# Patient Record
Sex: FEMALE | Race: WHITE | Employment: OTHER | URBAN - METROPOLITAN AREA
[De-identification: names, ages, dates, MRNs, and addresses within clinical notes are randomized per-mention and may not be internally consistent; named-entity substitution may affect disease eponyms.]

---

## 2023-10-07 RX ORDER — ALPRAZOLAM 0.25 MG
1 TABLET ORAL
Status: ON HOLD | COMMUNITY
End: 2023-10-10

## 2023-10-07 RX ORDER — TRIAMCINOLONE ACETONIDE 40 MG/ML
40 INJECTION, SUSPENSION INTRA-ARTICULAR; INTRAMUSCULAR
COMMUNITY

## 2023-10-07 RX ORDER — LEVOTHYROXINE SODIUM 25 UG/1
1 TABLET ORAL
COMMUNITY

## 2023-10-07 RX ORDER — CYCLOBENZAPRINE HCL 10 MG
1 TABLET ORAL 3 TIMES DAILY PRN
COMMUNITY

## 2023-10-07 NOTE — PROGRESS NOTES
PTA medications updated by Medication Scribe prior to surgery via phone call with patient (last doses completed by Nurse)     Medication history sources: Patient and H&P  In the past week, patient estimated taking medication this percent of the time: Greater than 90%      Significant changes made to the medication list:  Patient reports no longer taking the following meds (med scribe removed from PTA med list): Alendronate, Percocet      Additional medication history information:   Patient was advised to bring: Fluorometholone Suspension    Medication reconciliation completed by provider prior to medication history? No    Time spent in this activity: 20 minutes    The information provided in this note is only as accurate as the sources available at the time of update(s)    Prior to Admission medications    Medication Sig Last Dose Taking? Auth Provider Long Term End Date   ALPRAZolam (XANAX) 0.25 MG tablet Take 1 tablet by mouth every evening as needed Unknown at prn Yes Reported, Patient     alprazolam (XANAX) 0.25 MG tablet Take 1 tablet by mouth every morning  at am Yes Reported, Patient     cyclobenzaprine (FLEXERIL) 10 MG tablet Take 1 tablet by mouth 3 times daily as needed for muscle spasms Unknown at prn Yes Reported, Patient     fluorometholone (FML) 0.1 % ophthalmic suspension Place 1 drop Into the left eye daily.  at am Yes Reported, Patient     levothyroxine (SYNTHROID/LEVOTHROID) 25 MCG tablet Take 1 tablet by mouth every morning (before breakfast)  at am Yes Reported, Patient Yes    triamcinolone (KENALOG-40) 40 MG/ML injection Inject 40 mg into the muscle every 6 months AUGUST 2023 at Unknown Yes Reported, Patient         Medication history completed by:    Nahun Adrian CPhT  Medication Scribe  [facility]

## 2023-10-08 ENCOUNTER — ANESTHESIA EVENT (OUTPATIENT)
Dept: SURGERY | Facility: CLINIC | Age: 78
End: 2023-10-08
Payer: MEDICARE

## 2023-10-08 ASSESSMENT — LIFESTYLE VARIABLES: TOBACCO_USE: 1

## 2023-10-09 ENCOUNTER — APPOINTMENT (OUTPATIENT)
Dept: GENERAL RADIOLOGY | Facility: CLINIC | Age: 78
End: 2023-10-09
Attending: ORTHOPAEDIC SURGERY
Payer: MEDICARE

## 2023-10-09 ENCOUNTER — HOSPITAL ENCOUNTER (OUTPATIENT)
Facility: CLINIC | Age: 78
Discharge: HOME | End: 2023-10-10
Attending: ORTHOPAEDIC SURGERY | Admitting: ORTHOPAEDIC SURGERY
Payer: MEDICARE

## 2023-10-09 ENCOUNTER — MEDICAL CORRESPONDENCE (OUTPATIENT)
Dept: HEALTH INFORMATION MANAGEMENT | Facility: CLINIC | Age: 78
End: 2023-10-09

## 2023-10-09 ENCOUNTER — ANESTHESIA (OUTPATIENT)
Dept: SURGERY | Facility: CLINIC | Age: 78
End: 2023-10-09
Payer: MEDICARE

## 2023-10-09 ENCOUNTER — APPOINTMENT (OUTPATIENT)
Dept: PHYSICAL THERAPY | Facility: CLINIC | Age: 78
End: 2023-10-09
Attending: ORTHOPAEDIC SURGERY
Payer: MEDICARE

## 2023-10-09 DIAGNOSIS — Z96.641 HISTORY OF TOTAL HIP REPLACEMENT, RIGHT: Primary | ICD-10-CM

## 2023-10-09 PROBLEM — E03.8 OTHER SPECIFIED HYPOTHYROIDISM: Status: ACTIVE | Noted: 2023-10-02

## 2023-10-09 LAB
CREAT SERPL-MCNC: 0.8 MG/DL (ref 0.51–0.95)
EGFRCR SERPLBLD CKD-EPI 2021: 76 ML/MIN/1.73M2

## 2023-10-09 PROCEDURE — 258N000003 HC RX IP 258 OP 636: Performed by: ANESTHESIOLOGY

## 2023-10-09 PROCEDURE — 258N000003 HC RX IP 258 OP 636: Performed by: ORTHOPAEDIC SURGERY

## 2023-10-09 PROCEDURE — 999N000179 XR SURGERY CARM FLUORO LESS THAN 5 MIN W STILLS

## 2023-10-09 PROCEDURE — 97161 PT EVAL LOW COMPLEX 20 MIN: CPT | Mod: GP

## 2023-10-09 PROCEDURE — 250N000013 HC RX MED GY IP 250 OP 250 PS 637: Performed by: ORTHOPAEDIC SURGERY

## 2023-10-09 PROCEDURE — 272N000001 HC OR GENERAL SUPPLY STERILE: Performed by: ORTHOPAEDIC SURGERY

## 2023-10-09 PROCEDURE — 250N000011 HC RX IP 250 OP 636: Mod: JZ | Performed by: ANESTHESIOLOGY

## 2023-10-09 PROCEDURE — 250N000009 HC RX 250: Performed by: ANESTHESIOLOGY

## 2023-10-09 PROCEDURE — C1713 ANCHOR/SCREW BN/BN,TIS/BN: HCPCS | Performed by: ORTHOPAEDIC SURGERY

## 2023-10-09 PROCEDURE — 250N000011 HC RX IP 250 OP 636: Performed by: ORTHOPAEDIC SURGERY

## 2023-10-09 PROCEDURE — 97530 THERAPEUTIC ACTIVITIES: CPT | Mod: GP

## 2023-10-09 PROCEDURE — C1776 JOINT DEVICE (IMPLANTABLE): HCPCS | Performed by: ORTHOPAEDIC SURGERY

## 2023-10-09 PROCEDURE — 710N000009 HC RECOVERY PHASE 1, LEVEL 1, PER MIN: Performed by: ORTHOPAEDIC SURGERY

## 2023-10-09 PROCEDURE — 250N000011 HC RX IP 250 OP 636: Performed by: ANESTHESIOLOGY

## 2023-10-09 PROCEDURE — 258N000001 HC RX 258: Performed by: ORTHOPAEDIC SURGERY

## 2023-10-09 PROCEDURE — 250N000009 HC RX 250: Performed by: ORTHOPAEDIC SURGERY

## 2023-10-09 PROCEDURE — 999N000141 HC STATISTIC PRE-PROCEDURE NURSING ASSESSMENT: Performed by: ORTHOPAEDIC SURGERY

## 2023-10-09 PROCEDURE — 999N000063 XR PELVIS AND HIP PORTABLE RIGHT 1 VIEW

## 2023-10-09 PROCEDURE — 370N000017 HC ANESTHESIA TECHNICAL FEE, PER MIN: Performed by: ORTHOPAEDIC SURGERY

## 2023-10-09 PROCEDURE — 360N000085 HC SURGERY LEVEL 5 W/ FLUORO, PER MIN: Performed by: ORTHOPAEDIC SURGERY

## 2023-10-09 PROCEDURE — 97116 GAIT TRAINING THERAPY: CPT | Mod: GP

## 2023-10-09 PROCEDURE — 82565 ASSAY OF CREATININE: CPT | Performed by: ORTHOPAEDIC SURGERY

## 2023-10-09 PROCEDURE — 36415 COLL VENOUS BLD VENIPUNCTURE: CPT | Performed by: ORTHOPAEDIC SURGERY

## 2023-10-09 DEVICE — PINNACLE CANCELLOUS BONE SCREW 6.5MM X 35MM
Type: IMPLANTABLE DEVICE | Site: HIP | Status: FUNCTIONAL
Brand: PINNACLE

## 2023-10-09 DEVICE — PINNACLE HIP SOLUTIONS ALTRX POLYETHYLENE ACETABULAR LINER NEUTRAL 40MM ID 56MM OD
Type: IMPLANTABLE DEVICE | Site: HIP | Status: FUNCTIONAL
Brand: PINNACLE ALTRX

## 2023-10-09 DEVICE — PINNACLE GRIPTION ACETABULAR SHELL SECTOR 56MM OD
Type: IMPLANTABLE DEVICE | Site: HIP | Status: FUNCTIONAL
Brand: PINNACLE GRIPTION

## 2023-10-09 DEVICE — IMP STEM FEM DEPUY ACTIS STD COLLAR TPR SZ 8MM 1010-11-080: Type: IMPLANTABLE DEVICE | Site: HIP | Status: FUNCTIONAL

## 2023-10-09 DEVICE — BIOLOX DELTA TS CERAMIC FEMORAL HEAD 12/14 TAPER REVISION DIAMETER 40MM +5
Type: IMPLANTABLE DEVICE | Site: HIP | Status: FUNCTIONAL
Brand: BIOLOX DELTA

## 2023-10-09 RX ORDER — SODIUM CHLORIDE, SODIUM LACTATE, POTASSIUM CHLORIDE, CALCIUM CHLORIDE 600; 310; 30; 20 MG/100ML; MG/100ML; MG/100ML; MG/100ML
INJECTION, SOLUTION INTRAVENOUS CONTINUOUS
Status: DISCONTINUED | OUTPATIENT
Start: 2023-10-09 | End: 2023-10-10 | Stop reason: HOSPADM

## 2023-10-09 RX ORDER — VANCOMYCIN HYDROCHLORIDE 1 G/20ML
INJECTION, POWDER, LYOPHILIZED, FOR SOLUTION INTRAVENOUS PRN
Status: DISCONTINUED | OUTPATIENT
Start: 2023-10-09 | End: 2023-10-09 | Stop reason: HOSPADM

## 2023-10-09 RX ORDER — HYDROMORPHONE HCL IN WATER/PF 6 MG/30 ML
0.2 PATIENT CONTROLLED ANALGESIA SYRINGE INTRAVENOUS EVERY 5 MIN PRN
Status: DISCONTINUED | OUTPATIENT
Start: 2023-10-09 | End: 2023-10-09 | Stop reason: HOSPADM

## 2023-10-09 RX ORDER — ACETAMINOPHEN 325 MG/1
650 TABLET ORAL EVERY 4 HOURS PRN
Status: DISCONTINUED | OUTPATIENT
Start: 2023-10-12 | End: 2023-10-10 | Stop reason: HOSPADM

## 2023-10-09 RX ORDER — BISACODYL 10 MG
10 SUPPOSITORY, RECTAL RECTAL DAILY PRN
Status: DISCONTINUED | OUTPATIENT
Start: 2023-10-09 | End: 2023-10-10 | Stop reason: HOSPADM

## 2023-10-09 RX ORDER — ONDANSETRON 2 MG/ML
INJECTION INTRAMUSCULAR; INTRAVENOUS PRN
Status: DISCONTINUED | OUTPATIENT
Start: 2023-10-09 | End: 2023-10-09

## 2023-10-09 RX ORDER — HYDROMORPHONE HCL IN WATER/PF 6 MG/30 ML
0.4 PATIENT CONTROLLED ANALGESIA SYRINGE INTRAVENOUS
Status: DISCONTINUED | OUTPATIENT
Start: 2023-10-09 | End: 2023-10-10 | Stop reason: HOSPADM

## 2023-10-09 RX ORDER — FENTANYL CITRATE 0.05 MG/ML
50 INJECTION, SOLUTION INTRAMUSCULAR; INTRAVENOUS EVERY 5 MIN PRN
Status: DISCONTINUED | OUTPATIENT
Start: 2023-10-09 | End: 2023-10-09 | Stop reason: HOSPADM

## 2023-10-09 RX ORDER — NALOXONE HYDROCHLORIDE 0.4 MG/ML
0.2 INJECTION, SOLUTION INTRAMUSCULAR; INTRAVENOUS; SUBCUTANEOUS
Status: DISCONTINUED | OUTPATIENT
Start: 2023-10-09 | End: 2023-10-10 | Stop reason: HOSPADM

## 2023-10-09 RX ORDER — OXYCODONE HYDROCHLORIDE 5 MG/1
10 TABLET ORAL EVERY 4 HOURS PRN
Status: DISCONTINUED | OUTPATIENT
Start: 2023-10-09 | End: 2023-10-10 | Stop reason: HOSPADM

## 2023-10-09 RX ORDER — SODIUM CHLORIDE, SODIUM LACTATE, POTASSIUM CHLORIDE, CALCIUM CHLORIDE 600; 310; 30; 20 MG/100ML; MG/100ML; MG/100ML; MG/100ML
INJECTION, SOLUTION INTRAVENOUS CONTINUOUS
Status: DISCONTINUED | OUTPATIENT
Start: 2023-10-09 | End: 2023-10-09 | Stop reason: HOSPADM

## 2023-10-09 RX ORDER — NALOXONE HYDROCHLORIDE 0.4 MG/ML
0.4 INJECTION, SOLUTION INTRAMUSCULAR; INTRAVENOUS; SUBCUTANEOUS
Status: DISCONTINUED | OUTPATIENT
Start: 2023-10-09 | End: 2023-10-10 | Stop reason: HOSPADM

## 2023-10-09 RX ORDER — ONDANSETRON 4 MG/1
4 TABLET, ORALLY DISINTEGRATING ORAL EVERY 6 HOURS PRN
Status: DISCONTINUED | OUTPATIENT
Start: 2023-10-09 | End: 2023-10-10 | Stop reason: HOSPADM

## 2023-10-09 RX ORDER — LIDOCAINE 40 MG/G
CREAM TOPICAL
Status: DISCONTINUED | OUTPATIENT
Start: 2023-10-09 | End: 2023-10-10 | Stop reason: HOSPADM

## 2023-10-09 RX ORDER — ASPIRIN 81 MG/1
81 TABLET ORAL 2 TIMES DAILY
Qty: 82 TABLET | Refills: 0 | Status: SHIPPED | OUTPATIENT
Start: 2023-10-10 | End: 2023-11-20

## 2023-10-09 RX ORDER — PROCHLORPERAZINE MALEATE 5 MG
5 TABLET ORAL EVERY 6 HOURS PRN
Status: DISCONTINUED | OUTPATIENT
Start: 2023-10-09 | End: 2023-10-10 | Stop reason: HOSPADM

## 2023-10-09 RX ORDER — DEXMEDETOMIDINE HYDROCHLORIDE 4 UG/ML
INJECTION, SOLUTION INTRAVENOUS PRN
Status: DISCONTINUED | OUTPATIENT
Start: 2023-10-09 | End: 2023-10-09

## 2023-10-09 RX ORDER — TRANEXAMIC ACID 10 MG/ML
1 INJECTION, SOLUTION INTRAVENOUS ONCE
Status: DISCONTINUED | OUTPATIENT
Start: 2023-10-09 | End: 2023-10-09 | Stop reason: HOSPADM

## 2023-10-09 RX ORDER — TRANEXAMIC ACID 10 MG/ML
1 INJECTION, SOLUTION INTRAVENOUS ONCE
Status: COMPLETED | OUTPATIENT
Start: 2023-10-09 | End: 2023-10-09

## 2023-10-09 RX ORDER — ACETAMINOPHEN 325 MG/1
650 TABLET ORAL EVERY 4 HOURS PRN
Qty: 100 TABLET | Refills: 0 | COMMUNITY
Start: 2023-10-09

## 2023-10-09 RX ORDER — ASPIRIN 81 MG/1
81 TABLET ORAL 2 TIMES DAILY
Status: DISCONTINUED | OUTPATIENT
Start: 2023-10-09 | End: 2023-10-10 | Stop reason: HOSPADM

## 2023-10-09 RX ORDER — CEFAZOLIN SODIUM/WATER 2 G/20 ML
2 SYRINGE (ML) INTRAVENOUS
Status: DISCONTINUED | OUTPATIENT
Start: 2023-10-09 | End: 2023-10-09 | Stop reason: HOSPADM

## 2023-10-09 RX ORDER — ONDANSETRON 2 MG/ML
4 INJECTION INTRAMUSCULAR; INTRAVENOUS EVERY 30 MIN PRN
Status: DISCONTINUED | OUTPATIENT
Start: 2023-10-09 | End: 2023-10-09 | Stop reason: HOSPADM

## 2023-10-09 RX ORDER — HYDROMORPHONE HCL IN WATER/PF 6 MG/30 ML
0.2 PATIENT CONTROLLED ANALGESIA SYRINGE INTRAVENOUS
Status: DISCONTINUED | OUTPATIENT
Start: 2023-10-09 | End: 2023-10-10 | Stop reason: HOSPADM

## 2023-10-09 RX ORDER — POLYETHYLENE GLYCOL 3350 17 G/17G
1 POWDER, FOR SOLUTION ORAL DAILY
Qty: 7 PACKET | Refills: 0 | COMMUNITY
Start: 2023-10-09

## 2023-10-09 RX ORDER — AMOXICILLIN 250 MG
1-2 CAPSULE ORAL 2 TIMES DAILY
Qty: 30 TABLET | Refills: 0 | COMMUNITY
Start: 2023-10-09

## 2023-10-09 RX ORDER — FENTANYL CITRATE 0.05 MG/ML
25 INJECTION, SOLUTION INTRAMUSCULAR; INTRAVENOUS EVERY 5 MIN PRN
Status: DISCONTINUED | OUTPATIENT
Start: 2023-10-09 | End: 2023-10-09 | Stop reason: HOSPADM

## 2023-10-09 RX ORDER — ACETAMINOPHEN 325 MG/1
975 TABLET ORAL ONCE
Status: COMPLETED | OUTPATIENT
Start: 2023-10-09 | End: 2023-10-09

## 2023-10-09 RX ORDER — CEFAZOLIN SODIUM/WATER 2 G/20 ML
2 SYRINGE (ML) INTRAVENOUS SEE ADMIN INSTRUCTIONS
Status: DISCONTINUED | OUTPATIENT
Start: 2023-10-09 | End: 2023-10-09 | Stop reason: HOSPADM

## 2023-10-09 RX ORDER — AMOXICILLIN 250 MG
1 CAPSULE ORAL 2 TIMES DAILY
Status: DISCONTINUED | OUTPATIENT
Start: 2023-10-09 | End: 2023-10-10 | Stop reason: HOSPADM

## 2023-10-09 RX ORDER — MAGNESIUM HYDROXIDE 1200 MG/15ML
LIQUID ORAL PRN
Status: DISCONTINUED | OUTPATIENT
Start: 2023-10-09 | End: 2023-10-09 | Stop reason: HOSPADM

## 2023-10-09 RX ORDER — ONDANSETRON 2 MG/ML
4 INJECTION INTRAMUSCULAR; INTRAVENOUS EVERY 6 HOURS PRN
Status: DISCONTINUED | OUTPATIENT
Start: 2023-10-09 | End: 2023-10-10 | Stop reason: HOSPADM

## 2023-10-09 RX ORDER — HYDROMORPHONE HCL IN WATER/PF 6 MG/30 ML
0.4 PATIENT CONTROLLED ANALGESIA SYRINGE INTRAVENOUS EVERY 5 MIN PRN
Status: DISCONTINUED | OUTPATIENT
Start: 2023-10-09 | End: 2023-10-09 | Stop reason: HOSPADM

## 2023-10-09 RX ORDER — BUPIVACAINE HYDROCHLORIDE 7.5 MG/ML
INJECTION, SOLUTION INTRASPINAL PRN
Status: DISCONTINUED | OUTPATIENT
Start: 2023-10-09 | End: 2023-10-09

## 2023-10-09 RX ORDER — DEXAMETHASONE SODIUM PHOSPHATE 4 MG/ML
INJECTION, SOLUTION INTRA-ARTICULAR; INTRALESIONAL; INTRAMUSCULAR; INTRAVENOUS; SOFT TISSUE PRN
Status: DISCONTINUED | OUTPATIENT
Start: 2023-10-09 | End: 2023-10-09

## 2023-10-09 RX ORDER — CEFAZOLIN SODIUM 1 G/3ML
1 INJECTION, POWDER, FOR SOLUTION INTRAMUSCULAR; INTRAVENOUS EVERY 8 HOURS
Status: COMPLETED | OUTPATIENT
Start: 2023-10-09 | End: 2023-10-09

## 2023-10-09 RX ORDER — PROPOFOL 10 MG/ML
INJECTION, EMULSION INTRAVENOUS PRN
Status: DISCONTINUED | OUTPATIENT
Start: 2023-10-09 | End: 2023-10-09

## 2023-10-09 RX ORDER — OXYCODONE HYDROCHLORIDE 5 MG/1
5-10 TABLET ORAL EVERY 4 HOURS PRN
Qty: 26 TABLET | Refills: 0 | Status: SHIPPED | OUTPATIENT
Start: 2023-10-09

## 2023-10-09 RX ORDER — ONDANSETRON 4 MG/1
4 TABLET, ORALLY DISINTEGRATING ORAL EVERY 30 MIN PRN
Status: DISCONTINUED | OUTPATIENT
Start: 2023-10-09 | End: 2023-10-09 | Stop reason: HOSPADM

## 2023-10-09 RX ORDER — POLYETHYLENE GLYCOL 3350 17 G/17G
17 POWDER, FOR SOLUTION ORAL DAILY
Status: DISCONTINUED | OUTPATIENT
Start: 2023-10-10 | End: 2023-10-10 | Stop reason: HOSPADM

## 2023-10-09 RX ORDER — ACETAMINOPHEN 325 MG/1
975 TABLET ORAL EVERY 8 HOURS
Status: DISCONTINUED | OUTPATIENT
Start: 2023-10-09 | End: 2023-10-10 | Stop reason: HOSPADM

## 2023-10-09 RX ORDER — CELECOXIB 200 MG/1
200 CAPSULE ORAL 2 TIMES DAILY
Qty: 28 CAPSULE | Refills: 0 | Status: SHIPPED | OUTPATIENT
Start: 2023-10-10 | End: 2023-10-24

## 2023-10-09 RX ORDER — OXYCODONE HYDROCHLORIDE 5 MG/1
5 TABLET ORAL EVERY 4 HOURS PRN
Status: DISCONTINUED | OUTPATIENT
Start: 2023-10-09 | End: 2023-10-10 | Stop reason: HOSPADM

## 2023-10-09 RX ORDER — PROPOFOL 10 MG/ML
INJECTION, EMULSION INTRAVENOUS CONTINUOUS PRN
Status: DISCONTINUED | OUTPATIENT
Start: 2023-10-09 | End: 2023-10-09

## 2023-10-09 RX ORDER — KETOROLAC TROMETHAMINE 15 MG/ML
15 INJECTION, SOLUTION INTRAMUSCULAR; INTRAVENOUS EVERY 6 HOURS
Status: DISPENSED | OUTPATIENT
Start: 2023-10-09 | End: 2023-10-10

## 2023-10-09 RX ADMIN — BUPIVACAINE HYDROCHLORIDE IN DEXTROSE 1.8 ML: 7.5 INJECTION, SOLUTION SUBARACHNOID at 07:32

## 2023-10-09 RX ADMIN — SODIUM CHLORIDE, POTASSIUM CHLORIDE, SODIUM LACTATE AND CALCIUM CHLORIDE: 600; 310; 30; 20 INJECTION, SOLUTION INTRAVENOUS at 13:20

## 2023-10-09 RX ADMIN — KETOROLAC TROMETHAMINE 15 MG: 15 INJECTION INTRAMUSCULAR; INTRAVENOUS at 16:09

## 2023-10-09 RX ADMIN — PROPOFOL 20 MG: 10 INJECTION, EMULSION INTRAVENOUS at 10:32

## 2023-10-09 RX ADMIN — ASPIRIN 81 MG: 81 TABLET, COATED ORAL at 13:29

## 2023-10-09 RX ADMIN — MIDAZOLAM 1 MG: 1 INJECTION INTRAMUSCULAR; INTRAVENOUS at 07:30

## 2023-10-09 RX ADMIN — DEXMEDETOMIDINE HYDROCHLORIDE 8 MCG: 200 INJECTION INTRAVENOUS at 07:34

## 2023-10-09 RX ADMIN — DEXAMETHASONE SODIUM PHOSPHATE 10 MG: 4 INJECTION, SOLUTION INTRA-ARTICULAR; INTRALESIONAL; INTRAMUSCULAR; INTRAVENOUS; SOFT TISSUE at 07:52

## 2023-10-09 RX ADMIN — ACETAMINOPHEN 975 MG: 325 TABLET, FILM COATED ORAL at 22:08

## 2023-10-09 RX ADMIN — HYDROMORPHONE HYDROCHLORIDE 0.5 MG: 1 INJECTION, SOLUTION INTRAMUSCULAR; INTRAVENOUS; SUBCUTANEOUS at 10:35

## 2023-10-09 RX ADMIN — FENTANYL CITRATE 50 MCG: 50 INJECTION, SOLUTION INTRAMUSCULAR; INTRAVENOUS at 11:33

## 2023-10-09 RX ADMIN — SODIUM CHLORIDE, POTASSIUM CHLORIDE, SODIUM LACTATE AND CALCIUM CHLORIDE: 600; 310; 30; 20 INJECTION, SOLUTION INTRAVENOUS at 06:49

## 2023-10-09 RX ADMIN — Medication 2 G: at 07:26

## 2023-10-09 RX ADMIN — TRANEXAMIC ACID 1 G: 10 INJECTION, SOLUTION INTRAVENOUS at 10:22

## 2023-10-09 RX ADMIN — SENNOSIDES AND DOCUSATE SODIUM 1 TABLET: 50; 8.6 TABLET ORAL at 20:16

## 2023-10-09 RX ADMIN — DEXMEDETOMIDINE HYDROCHLORIDE 8 MCG: 200 INJECTION INTRAVENOUS at 08:34

## 2023-10-09 RX ADMIN — PROPOFOL 150 MCG/KG/MIN: 10 INJECTION, EMULSION INTRAVENOUS at 07:36

## 2023-10-09 RX ADMIN — OXYCODONE HYDROCHLORIDE 10 MG: 5 TABLET ORAL at 14:23

## 2023-10-09 RX ADMIN — ASPIRIN 81 MG: 81 TABLET, COATED ORAL at 22:08

## 2023-10-09 RX ADMIN — DEXMEDETOMIDINE HYDROCHLORIDE 4 MCG: 200 INJECTION INTRAVENOUS at 10:15

## 2023-10-09 RX ADMIN — SODIUM CHLORIDE, POTASSIUM CHLORIDE, SODIUM LACTATE AND CALCIUM CHLORIDE: 600; 310; 30; 20 INJECTION, SOLUTION INTRAVENOUS at 10:19

## 2023-10-09 RX ADMIN — CEFAZOLIN 1 G: 1 INJECTION, POWDER, FOR SOLUTION INTRAMUSCULAR; INTRAVENOUS at 22:09

## 2023-10-09 RX ADMIN — ONDANSETRON 4 MG: 2 INJECTION INTRAMUSCULAR; INTRAVENOUS at 10:15

## 2023-10-09 RX ADMIN — ACETAMINOPHEN 975 MG: 325 TABLET, FILM COATED ORAL at 06:59

## 2023-10-09 RX ADMIN — KETOROLAC TROMETHAMINE 15 MG: 15 INJECTION INTRAMUSCULAR; INTRAVENOUS at 22:09

## 2023-10-09 RX ADMIN — TRANEXAMIC ACID 1 G: 10 INJECTION, SOLUTION INTRAVENOUS at 07:48

## 2023-10-09 RX ADMIN — PHENYLEPHRINE HYDROCHLORIDE 150 MCG: 10 INJECTION INTRAVENOUS at 07:43

## 2023-10-09 RX ADMIN — SENNOSIDES AND DOCUSATE SODIUM 1 TABLET: 50; 8.6 TABLET ORAL at 13:32

## 2023-10-09 RX ADMIN — CEFAZOLIN 1 G: 1 INJECTION, POWDER, FOR SOLUTION INTRAMUSCULAR; INTRAVENOUS at 15:35

## 2023-10-09 RX ADMIN — PHENYLEPHRINE HYDROCHLORIDE 0.5 MCG/KG/MIN: 10 INJECTION INTRAVENOUS at 07:38

## 2023-10-09 RX ADMIN — ACETAMINOPHEN 975 MG: 325 TABLET, FILM COATED ORAL at 13:29

## 2023-10-09 RX ADMIN — MIDAZOLAM 1 MG: 1 INJECTION INTRAMUSCULAR; INTRAVENOUS at 07:26

## 2023-10-09 RX ADMIN — OXYCODONE HYDROCHLORIDE 10 MG: 5 TABLET ORAL at 20:16

## 2023-10-09 ASSESSMENT — ACTIVITIES OF DAILY LIVING (ADL)
ADLS_ACUITY_SCORE: 26
ADLS_ACUITY_SCORE: 24
ADLS_ACUITY_SCORE: 24
ADLS_ACUITY_SCORE: 26
ADLS_ACUITY_SCORE: 26
ADLS_ACUITY_SCORE: 24
ADLS_ACUITY_SCORE: 24
ADLS_ACUITY_SCORE: 26
ADLS_ACUITY_SCORE: 26

## 2023-10-09 NOTE — ANESTHESIA PROCEDURE NOTES
"Intrathecal Procedure Note    Pre-Procedure   Staff -        Anesthesiologist:  Brooks Vanegas MD       Performed By: anesthesiologist       Location: OR       Pre-Anesthestic Checklist: patient identified, IV checked, site marked, risks and benefits discussed, informed consent, monitors and equipment checked, pre-op evaluation and at physician/surgeon's request  Timeout:       Correct Patient: Yes        Correct Procedure: Yes        Correct Site: Yes        Correct Position: Yes   Procedure Documentation  Procedure: intrathecal       Patient Position: sitting       Patient Prep/Sterile Barriers: sterile gloves, mask, patient draped       Skin prep: Chloraprep       Insertion Site: L3-4. (midline approach).       Needle Gauge: 24.        Needle Length (Inches): 4        Spinal Needle Type: Pencan       Introducer used       Introducer: 20 G       # of attempts: 1 and  # of redirects:  0    Assessment/Narrative         Paresthesias: No.       CSF fluid: clear.       Opening pressure was cmH2O while  Sitting.       Comments:  Patient tolerated the procedure well      FOR [facility] ([facility]) ONLY:   Pain Team Contact information: please page the Pain Team Via [employer]. Search \"Pain\". During daytime hours, please page the attending first. At night please page the resident first.      "

## 2023-10-09 NOTE — PROGRESS NOTES
PROGRESS NOTE    SUBJECTIVE  Gisselle Dudley is status post right total hip arthroplasty on 10/9/23. She has already been up and walked the halls with PT. Patient denies any numbness/tingling in the operative extremity, chest pain or shortness of breath.     PHYSICAL EXAM  General: No acute distress. Alert and oriented to person, time and place.  Pulmonary: Breathing comfortably on room air.   Cardiac: Bilateral PT pulses are equal and symmetric. Regular rate and rhythm.   Musculoskeletal: Postoperative dressing is clean and dry. No erythema surrounding dressing. Patient demonstrates active range of motion of the operative hip.   Neurological: Patient is able to fire quadriceps, tibialis anterior, extensor hallucis longus and gastrocnemius. Sensation intact to light touch in bilateral L3-S1 distributions.     LABS  AM hemoglobin pending 10/10    IMAGING  Post-operative radiographs were reviewed and demonstrate components in good position. No evidence of fracture or dislocation.     ASSESSMENT/PLAN  #1 Status post right total hip arthroplasty on 10/9/23    Gisselle Dudley is status post the above procedure. She will work with PT again tomorrow and likely discharge home tomorrow.     Activity: Weightbearing as tolerated. Activity as tolerated. No precautions. Continue to work with PT while in hospital.   Antibiotics: Weight-based Ancef x2 doses postoperatively or until discharge.  Cultures: None  Diet: Advance as tolerated. Goal diet: General.   Dressing: Patient is able to shower. Patient will keep dressing in place until post-op day 7.  DVT Prophylaxis: Aspirin 81mg BID for 6 weeks  Pain medication: Multimodal pain regimen with ice, elevation, Tylenol 1000mg Q6H scheduled, Celebrex 200mg BID, Oxycodone 5-10mg Q4 PRN    Disposition: Stable and improving. Discharge home when patient has cleared PT

## 2023-10-09 NOTE — OP NOTE
OPERATIVE NOTE    Name: Gisselle Dudley  MRN: [id number]  Age: 78 year old    YOB: 1945    Date of Procedure: 10/9/2023      Pre-operative Diagnosis:   Right hip end-stage osteoarthritis, failure to respond to non-operative management.     Post-operative Diagnosis:   Right hip end-stage osteoarthritis, failure to respond to non-operative management.     Procedure:   Right total hip arthroplasty    Assistant:  Dana Fulton PA-C    A skilled first assistant was necessary for this procedure for assistance with patient positioning, prepping, draping, surgical visualization, wound closure, and application of the dressing.     Anesthesia:  1. Spinal    Complications:  None    Estimated blood loss:   350 cc    Transfusions:  None    Fluids:  Per anesthesia    Specimens:  None    Drain:  None      Indications:   The patient is a very pleasant 78 year old female with a chief complaint of progressive hip pain that limits their quality of life and limits their function. The physical examination demonstrates painful and limited range of motion of the right hip.  The x-ray showed complete loss of joint space, osteophytes and subchondral sclerosis involving the right hip.     The patient has tried nonoperative measures to control their pain including non-opioid pain mediation, activity modification, low impact exercises, assistive devices, and injections, none of which have provided satisfactory pain relief. The patient desires joint replacement of the hip to improve their lifestyle and reduce their pain.       Informed Consent:  Preoperatively, the risks, benefits, and possible complications of the procedure were discussed. The risks discussed included but were not limited to wear, loosening, infection, neurovascular damage, thromboembolic disease, foot drop, limb length inequality, persistent pain, lateral thigh numbness, wound healing complications, bleeding, transfusion, stiffness and dislocation. All of  the questions were satisfactorily answered and the patient wished to proceed with joint replacement surgery to improve their lifestyle and reduce their pain.       Components:  Depuy [place] Cup Size: 56 Gription  Depuy [place] Femoral Component Size: 9, standard  Liner: Neutral  Head: 40 +5  Acetabular screws: 6.5x25mm, 6.5x35mm    Procedural Pause:   The patient's correct identity, side, site, and procedure to be performed was verified.  Intravenous antibiotics were administered prior to skin incision.    Description of Procedure:   Gisselle Dudley was brought to the operating room. Neuraxial anesthesia was utilized. The patient was then transferred to the Revloc table. The feet were padded and placed into the boots for the Revloc table. The patient received weight-based Ancef and tranexamic acid prior to skin incision. A procedural pause was performed as described above. Pre-operative AP pelvis and AP of the operative hip were obtained prior to the incision and saved for comparison later in the case.   An anterior hip incision was performed approximately 2cm distal and 2cm lateral to the anterior superior iliac spine on the operative side. Dissection was carried down to the tesnor fascia which was incised in line with the skin incision. The interval between the tensor fascia jackelin and the rectus femoris was developed. The lateral circumflex vessels were identified and cauterized with electrocautery. The deep fascia was then incised and the precapsular fat removed. A capsulotomy was performed and the capsule was tagged for later repair. The osteotomy of the femoral neck was marked and checked with fluoroscopy. Satisfied with the height of the neck cut, the osteotomy was performed with the saw making sure to protect the greater trochanter. The neck and femoral head were removed. The acetabulum was then exposed and the labrum and pulvinar were removed. With the preoperative template as a guide, we then began reaming  under direct visualization and the position of the reamer was confirmed with fluoroscopy. The final reamer position was confirmed with fluoroscopy and the hip was irrigated. We then impacted the final cup which was one size greater than the final reamer under fluoroscopy and checked the cup's final position with fluoroscopy. Satisfied with the position of the cup, 2 screws were placed. The cup was again irrigated and the final liner impacted into place. We used a freer to make sure the tabs were fully seated within the cup.   The femoral elevator hook was placed around the proximal femur. The femur was externally rotated 120 degrees. We made sure traction was off the femur. The leg was then extended and adducted. As the leg was extended, I pulled the femur laterally to make sure the greater trochanter did not get stuck posterior to the acetabulum. The femoral releases were then performed starting with developing a plane between the gluteus minimus and the posterior capsule. Selective release were subsequently performed as needed combined with femoral elevation in order to adequately exposed the proximal femur for safe broaching. Satisfied with our exposure, the box osteotome was utilized to open the femur. A canal finder was then introduced followed by the starting broach. I broached until I achieved rotational stability and used the preoperative templating as a guide. Satisfied with the stability of the broach, the trial neck and head were placed. The femoral elevator was removed and the hip was brought back to the starting position. I made sure the posterior capsule was behind the femoral head and neck and wouldn't block our reduction. The hip was then reduced and traction was released. The position of the acetabulum and femur were checked with fluoroscopy. Using the images saved at the beginning of the procedure, the position of the trial was compared to the native hip and the preoperative template. The position  of the femoral trial was checked on an AP and lateral to make sure there were no previously unidentified fracture lines. Stability of the hip was checked with a shuck test and with hip external rotation and hip extension. Satisfied with the position of the implants, leg lengths and offset, and hip stability, the hip was dislocated. The femur was exposed again. The broach was removed. The femoral canal was irrigated. The final implant was placed. The final head was placed. The hip was then reduced and final radiographs were obtained. Satisfied with the position of the final implants, we proceed to closure.   The wound was irrigated with one liter of normal saline and betadine irrigation. The hip was injected with arthroplasty block. The hip capsule was closed with Ethibond suture and 0-vicryl. The tensor fascia was closed with interrupted 0-vicryl and Stratafix. The subcutaneous layers were closed with interrupted 2-0 vicryl and the skin was closed with a running 3-0 monocryl. Dermabond was utilized on the wound and an Aquacel dressing was placed.   The patient was awakened and transported to the postanesthesia care unit in stable condition at the end of the case. Sponge and needle counts were correct.       Post-operative Plan:  Activity: Weightbearing as tolerated  Antibiotics: Weight-based Ancef x2 doses   DVT: Aspirin 81mg BID for 6 weeks  Wound: Aquacel for 7 days   Disposition: Outpatient overnight      Bc Hoffmann MD  [facility]  Phone: [phone]  Fax: [phone]

## 2023-10-09 NOTE — ANESTHESIA CARE TRANSFER NOTE
Patient: Gisselle Dudley    Procedure: Procedure(s):  ARTHROPLASTY, HIP, TOTAL, DIRECT ANTERIOR APPROACH, USING ORTHOGRID       Diagnosis: Osteoarthritis [M19.90]  Diagnosis Additional Information: No value filed.    Anesthesia Type:   Spinal     Note:    Oropharynx: oropharynx clear of all foreign objects and spontaneously breathing  Level of Consciousness: drowsy  Oxygen Supplementation: face mask  Level of Supplemental Oxygen (L/min / FiO2): 6  Independent Airway: airway patency satisfactory and stable  Dentition: dentition unchanged  Vital Signs Stable: post-procedure vital signs reviewed and stable  Report to RN Given: handoff report given  Patient transferred to: PACU  Comments: Pt exhibits spontaneous respirations, all monitors and alarms on in PACU, VSS, patent IV, report and transfer of care to RN.    Handoff Report: Identifed the Patient, Identified the Reponsible Provider, Reviewed the pertinent medical history, Discussed the surgical course, Reviewed Intra-OP anesthesia mangement and issues during anesthesia, Set expectations for post-procedure period and Allowed opportunity for questions and acknowledgement of understanding      Vitals:  Vitals Value Taken Time   BP 89/54 10/09/23 1048   Temp     Pulse 66 10/09/23 1050   Resp 9 10/09/23 1050   SpO2 99 % 10/09/23 1050   Vitals shown include unvalidated device data.    Electronically Signed By: COREY Brower CRNA  October 9, 2023  10:52 AM

## 2023-10-09 NOTE — ANESTHESIA POSTPROCEDURE EVALUATION
Patient: Gisselle Dudley    Procedure: Procedure(s):  ARTHROPLASTY, HIP, TOTAL, DIRECT ANTERIOR APPROACH, USING ORTHOGRID       Anesthesia Type:  Spinal    Note:  Disposition: Admission   Postop Pain Control: Uneventful            Sign Out: Well controlled pain   PONV: No   Neuro/Psych: Uneventful            Sign Out: Acceptable/Baseline neuro status   Airway/Respiratory: Uneventful            Sign Out: Acceptable/Baseline resp. status   CV/Hemodynamics: Uneventful            Sign Out: Acceptable CV status   Other NRE: NONE   DID A NON-ROUTINE EVENT OCCUR? No           Last vitals:  Vitals Value Taken Time   /105 10/09/23 1215   Temp 36.9  C (98.5  F) 10/09/23 1200   Pulse 70 10/09/23 1217   Resp 11 10/09/23 1217   SpO2 99 % 10/09/23 1217   Vitals shown include unvalidated device data.    Electronically Signed By: Brooks Vanegas MD  October 9, 2023  3:13 PM

## 2023-10-09 NOTE — PROGRESS NOTES
"   10/09/23 1800   Appointment Info   Signing Clinician's Name / Credentials (PT) Rupa Mclaughlin DPT   Living Environment   People in Home other relative(s)  (Pt will be staying with her cousin following discharge)   Current Living Arrangements house   Home Accessibility stairs to enter home;stairs within home   Number of Stairs, Main Entrance 1  (Wide step)   Stair Railings, Main Entrance none   Number of Stairs, Within Home, Primary greater than 10 stairs   Stair Railings, Within Home, Primary railing on right side (ascending)   Transportation Anticipated family or friend will provide   Living Environment Comments Pt reports she will be living with her cousin upon d/c from hospital. Reports there is 1 stair to enter the house which is wide enough to fit a walker. Once inside there is 6 stairs, a landing, and then 6 more stairs to get to bedroom and bathroom.   Self-Care   Usual Activity Tolerance good   Current Activity Tolerance fair   Regular Exercise No   Equipment Currently Used at Home none   Fall history within last six months no   Activity/Exercise/Self-Care Comment Pt typically IND w/ all functional mobility without AD and ADL's   General Information   Onset of Illness/Injury or Date of Surgery 10/09/23   Referring Physician Bc Hoffmann MD   Patient/Family Therapy Goals Statement (PT) \"To go back home\"   Pertinent History of Current Problem (include personal factors and/or comorbidities that impact the POC) Pt is a 77 yo status post right total hip arthroplasty on 10/9/23.   Existing Precautions/Restrictions fall;weight bearing   Weight-Bearing Status - LUE full weight-bearing   Weight-Bearing Status - RUE full weight-bearing   Weight-Bearing Status - LLE full weight-bearing   Weight-Bearing Status - RLE weight-bearing as tolerated   Cognition   Affect/Mental Status (Cognition) WFL   Orientation Status (Cognition) oriented x 4   Follows Commands (Cognition) WFL   Pain Assessment   Patient " Currently in Pain No   Integumentary/Edema   Integumentary/Edema no deficits were identifed   Posture    Posture Forward head position;Protracted shoulders   Range of Motion (ROM)   Range of Motion ROM deficits secondary to surgical procedure;ROM deficits secondary to swelling;ROM deficits secondary to weakness;ROM deficits secondary to pain   Strength (Manual Muscle Testing)   Strength (Manual Muscle Testing) Able to perform R SLR;Able to perform L SLR;Deficits observed during functional mobility   Bed Mobility   Comment, (Bed Mobility) Pt completed supine>sitting EOB w/ CGA   Transfers   Comment, (Transfers) Sit>stand completed w/ CGA   Gait/Stairs (Locomotion)   Distance in Feet (Gait) 100 ft   Comment, (Gait/Stairs) Pt ambulated w/ CGA and FWW   Balance   Balance other (describe)   Balance Comments Pt demosntrated good sitting and static standing balance, required FWW for dynamic balance   Sensory Examination   Sensory Perception patient reports no sensory changes   Coordination   Coordination no deficits were identified   Muscle Tone   Muscle Tone no deficits were identified   Clinical Impression   Criteria for Skilled Therapeutic Intervention Yes, treatment indicated   PT Diagnosis (PT) Impaired fucntional mobility   Influenced by the following impairments Impaired activity tolerance, post-surgical pain, impaired balance, weakness   Functional limitations due to impairments Impiared gait and inability to complete ADL's   Clinical Presentation (PT Evaluation Complexity) Stable/Uncomplicated   Clinical Presentation Rationale Clinical judgment   Clinical Decision Making (Complexity) low complexity   Planned Therapy Interventions (PT) balance training;bed mobility training;gait training;home exercise program;neuromuscular re-education;patient/family education;postural re-education;ROM (range of motion);stair training;strengthening;stretching;transfer training;progressive activity/exercise;home program guidelines    Anticipated Equipment Needs at Discharge (PT) walker, rolling   Risk & Benefits of therapy have been explained evaluation/treatment results reviewed;care plan/treatment goals reviewed;risks/benefits reviewed;current/potential barriers reviewed;participants voiced agreement with care plan;participants included;patient   PT Total Evaluation Time   PT Eval, Low Complexity Minutes (00236) 10   Physical Therapy Goals   PT Frequency Daily   PT Predicted Duration/Target Date for Goal Attainment 10/12/23   PT Goals Bed Mobility;Transfers;Gait;Stairs   PT: Bed Mobility Independent;Supine to/from sit;Rolling;Bridging;Within precautions   PT: Transfers Modified independent;Sit to/from stand;Bed to/from chair;Assistive device   PT: Gait Modified independent;Rolling walker;Greater than 200 feet   PT: Stairs Modified independent;Greater than 10 stairs;Rail on left   Interventions   Interventions Quick Adds Therapeutic Activity;Gait Training;Therapeutic Procedure   Therapeutic Procedure/Exercise   Ther. Procedure: strength, endurance, ROM, flexibillity Minutes (99280) 2   Treatment Detail/Skilled Intervention Pt instructed in and completed ankle pumps and LAQ while sitting in order to promote fluid movement.   Therapeutic Activity   Therapeutic Activities: dynamic activities to improve functional performance Minutes (04984) 15   Treatment Detail/Skilled Intervention Evaluation completed and treatment indicated. Pt educated on WBAT status. Supine>sitting EOB completed w/ CGA, pt cued for technique throughout. Sit>stand completed w/ CGA, cues given to push up from bed rather than FWW when standing. [place] transfer completed w/ CGA and toliet riser present. Pt able to complete pericares on own, discussed benefit of toliet seat riser. Following ambulation, pt educated on icing, walking program, and pacing. Pt had quesions regarding SPC cane usage, pt educated on using this after FWW and for proper usage. Pt left sitting up in  recliner with all needs in reach.   Gait Training   Gait Training Minutes (55883) 10   Treatment Detail/Skilled Intervention Pt ambulated ~100 ft w/ FWW and CGA. Pt demonstrated short step length, forward flexed posture, cued for upright posture, FWW proximity, increased step length, and pacing throughout. No overt LOB occurred. Discussed stair technique but did not complete stair training at this time.   Assistive Device (Gait Training) rolling walker   PT Discharge Planning   PT Plan Trial stairs, progress gait distance, LE exercise handout   PT Discharge Recommendation (DC Rec)   (Deffer to Ortho)   PT Rationale for DC Rec Pt is moving below baseline mobility, currently requiring FWW for all mobility but is doing well post-operatively. Anticipate with one more inpatient PT session and trial of stairs that pt will be safe to return home with assist to her cousins house and use of walker for all functional mobility.   PT Brief overview of current status CGA for all functional mobility   Total Session Time   Timed Code Treatment Minutes 27   Total Session Time (sum of timed and untimed services) 37                                                                                 [facility]      OUTPATIENT PHYSICAL THERAPY EVALUATION  PLAN OF TREATMENT FOR OUTPATIENT REHABILITATION  (COMPLETE FOR INITIAL CLAIMS ONLY)  Patient's Last Name, First Name, M.I.  YOB: 1945  Gisselle Dudley                        Provider's Name  [facility] Medical Record No.  [id number]                               Onset Date:  10/09/23   Start of Care Date:         Type:     _X_PT   ___OT   ___SLP Medical Diagnosis:                           PT Diagnosis:  Impaired fucntional mobility   Visits from SOC:  1   _________________________________________________________________________________  Plan of Treatment/Functional Goals    Planned Interventions: balance  training, bed mobility training, gait training, home exercise program, neuromuscular re-education, patient/family education, postural re-education, ROM (range of motion), stair training, strengthening, stretching, transfer training, progressive activity/exercise, home program guidelines     Goals: See Physical Therapy Goals on Care Plan in [facility] electronic health record.    Therapy Frequency: Daily  Predicted Duration of Therapy Intervention: 10/12/23  _________________________________________________________________________________    I CERTIFY THE NEED FOR THESE SERVICES FURNISHED UNDER        THIS PLAN OF TREATMENT AND WHILE UNDER MY CARE     (Physician attestation of this document indicates review and certification of the therapy plan).               ,      Referring Physician: Bc Hoffmann MD            Initial Assessment        See Physical Therapy evaluation dated   in Epic electronic health record.

## 2023-10-09 NOTE — INTERVAL H&P NOTE
"I have reviewed the surgical (or preoperative) H&P that is linked to this encounter, and examined the patient. There are no significant changes    Clinical Conditions Present on Arrival:  Clinically Significant Risk Factors Present on Admission                  # Overweight: Estimated body mass index is 25.18 kg/m  as calculated from the following:    Height as of this encounter: 1.676 m (5' 6\").    Weight as of this encounter: 70.8 kg (156 lb).       "

## 2023-10-09 NOTE — ANESTHESIA PREPROCEDURE EVALUATION
Anesthesia Pre-Procedure Evaluation    Patient: Gisselle Dudley   MRN: [id number] : 1945        Procedure : Procedure(s):  Right Hip Direct Anterior Arthroplasty          Past Medical History:   Diagnosis Date    allergic rhinitis     Depression with anxiety     Hypothyroidism     Osteoporosis     Uveitis       Past Surgical History:   Procedure Laterality Date    BREAST BIOPSY, RT/LT Left     BREAST CYST ASPIRATION Bilateral     CHOLECYSTECTOMY      EXCISE MASS BACK  2011    Procedure:EXCISE MASS BACK; biopsy subcutaneous mass on right side.; Surgeon:TEJAL CANALES; Location:[facility]    EYE SURGERY      FUSION CERVICAL POSTERIOR TWO LEVELS  2011    Procedure:FUSION CERVICAL POSTERIOR TWO LEVELS; LEFT C5-6 AND C6-7 FORAMINOTOMY, DISCECTOMY, BIOPSY SUBCUTANEOUS MASS ON RIGHT SIDE; Surgeon:TEJAL CANALES; Location:[facility] OR    GENITOURINARY SURGERY      stent and stones    KIDNEY STONE SURGERY      right thumb trigger release Right     TONSILLECTOMY        Allergies   Allergen Reactions    Morphine Hcl Nausea and Vomiting    Eggs Dermatitis and Difficulty breathing      Social History     Tobacco Use    Smoking status: Former     Types: Cigarettes     Quit date: 2009     Years since quittin.8    Smokeless tobacco: Never   Substance Use Topics    Alcohol use: Yes     Comment: 2/day      Wt Readings from Last 1 Encounters:   11 74.2 kg (163 lb 9.3 oz)        Anesthesia Evaluation            ROS/MED HX  ENT/Pulmonary:     (+)           allergic rhinitis,     tobacco use, Past use,                   (-) sleep apnea   Neurologic:     (+)    no peripheral neuropathy                            Cardiovascular:       METS/Exercise Tolerance:     Hematologic:       Musculoskeletal:   (+)  arthritis,             GI/Hepatic:    (-) GERD   Renal/Genitourinary:     (+)       Nephrolithiasis ,       Endo:     (+)          thyroid problem, hypothyroidism,            Psychiatric/Substance Use:     (+) psychiatric history depression and anxiety       Infectious Disease:       Malignancy:       Other:            Physical Exam    Airway        Mallampati: I   TM distance: > 3 FB   Neck ROM: full   Mouth opening: > 3 cm    Respiratory Devices and Support         Dental       (+) Minor Abnormalities - some fillings, tiny chips      Cardiovascular   cardiovascular exam normal          Pulmonary   pulmonary exam normal                OUTSIDE LABS:  CBC: No results found for: WBC, HGB, HCT, PLT  BMP: No results found for: NA, POTASSIUM, CHLORIDE, CO2, BUN, CR, GLC  COAGS: No results found for: PTT, INR, FIBR  POC: No results found for: BGM, HCG, HCGS  HEPATIC: No results found for: ALBUMIN, PROTTOTAL, ALT, AST, GGT, ALKPHOS, BILITOTAL, BILIDIRECT, RANCHO  OTHER: No results found for: PH, LACT, A1C, AHSAN, PHOS, MAG, LIPASE, AMYLASE, TSH, T4, T3, CRP, SED    Anesthesia Plan    ASA Status:  2    NPO Status:  NPO Appropriate    Anesthesia Type: Spinal.              Consents    Anesthesia Plan(s) and associated risks, benefits, and realistic alternatives discussed. Questions answered and patient/representative(s) expressed understanding.     - Discussed:     - Discussed with:  Patient            Postoperative Care    Pain management: Multi-modal analgesia.   PONV prophylaxis: Ondansetron (or other 5HT-3)     Comments:                Brooks Vanegas MD

## 2023-10-09 NOTE — PLAN OF CARE
Goal Outcome Evaluation:       Patient vital signs are at baseline: Yes  Patient able to ambulate as they were prior to admission or with assist devices provided by therapies during their stay:  Yes  Patient MUST void prior to discharge:  Yes  Patient able to tolerate oral intake:  Yes  Pain has adequate pain control using Oral analgesics:  Yes  Does patient have an identified :  Yes  Has goal D/C date and time been discussed with patient:  Yes    Pt arrived to floor at around 1245. A&OX4. VSS on room air. Up Ao1 with GBW. Pt was able to void adequately. Tolerating regular diet no complaints of N/V. CMS intact. Discharge planned for tomorrow morning.

## 2023-10-10 ENCOUNTER — APPOINTMENT (OUTPATIENT)
Dept: PHYSICAL THERAPY | Facility: CLINIC | Age: 78
End: 2023-10-10
Attending: ORTHOPAEDIC SURGERY
Payer: MEDICARE

## 2023-10-10 ENCOUNTER — APPOINTMENT (OUTPATIENT)
Dept: OCCUPATIONAL THERAPY | Facility: CLINIC | Age: 78
End: 2023-10-10
Attending: ORTHOPAEDIC SURGERY
Payer: MEDICARE

## 2023-10-10 VITALS
WEIGHT: 156 LBS | OXYGEN SATURATION: 99 % | DIASTOLIC BLOOD PRESSURE: 64 MMHG | RESPIRATION RATE: 14 BRPM | TEMPERATURE: 98.3 F | SYSTOLIC BLOOD PRESSURE: 118 MMHG | HEIGHT: 66 IN | BODY MASS INDEX: 25.07 KG/M2 | HEART RATE: 70 BPM

## 2023-10-10 LAB
GLUCOSE BLDC GLUCOMTR-MCNC: 109 MG/DL (ref 70–99)
HGB BLD-MCNC: 9.7 G/DL (ref 11.7–15.7)

## 2023-10-10 PROCEDURE — 250N000013 HC RX MED GY IP 250 OP 250 PS 637: Performed by: ORTHOPAEDIC SURGERY

## 2023-10-10 PROCEDURE — 97165 OT EVAL LOW COMPLEX 30 MIN: CPT | Mod: GO

## 2023-10-10 PROCEDURE — 97530 THERAPEUTIC ACTIVITIES: CPT | Mod: GP,CQ | Performed by: PHYSICAL THERAPY ASSISTANT

## 2023-10-10 PROCEDURE — 97110 THERAPEUTIC EXERCISES: CPT | Mod: GP,CQ | Performed by: PHYSICAL THERAPY ASSISTANT

## 2023-10-10 PROCEDURE — 85018 HEMOGLOBIN: CPT | Performed by: ORTHOPAEDIC SURGERY

## 2023-10-10 PROCEDURE — 82962 GLUCOSE BLOOD TEST: CPT

## 2023-10-10 PROCEDURE — 97535 SELF CARE MNGMENT TRAINING: CPT | Mod: GO

## 2023-10-10 PROCEDURE — 250N000011 HC RX IP 250 OP 636: Performed by: ORTHOPAEDIC SURGERY

## 2023-10-10 PROCEDURE — 36415 COLL VENOUS BLD VENIPUNCTURE: CPT | Performed by: ORTHOPAEDIC SURGERY

## 2023-10-10 PROCEDURE — 97116 GAIT TRAINING THERAPY: CPT | Mod: GP,CQ | Performed by: PHYSICAL THERAPY ASSISTANT

## 2023-10-10 RX ADMIN — ASPIRIN 81 MG: 81 TABLET, COATED ORAL at 09:44

## 2023-10-10 RX ADMIN — ACETAMINOPHEN 975 MG: 325 TABLET, FILM COATED ORAL at 05:07

## 2023-10-10 RX ADMIN — KETOROLAC TROMETHAMINE 15 MG: 15 INJECTION INTRAMUSCULAR; INTRAVENOUS at 03:18

## 2023-10-10 ASSESSMENT — ACTIVITIES OF DAILY LIVING (ADL)
ADLS_ACUITY_SCORE: 30
ADLS_ACUITY_SCORE: 26
ADLS_ACUITY_SCORE: 26
ADLS_ACUITY_SCORE: 30
PREVIOUS_RESPONSIBILITIES: MEAL PREP;HOUSEKEEPING;LAUNDRY;SHOPPING;YARDWORK;MEDICATION MANAGEMENT;FINANCES;DRIVING

## 2023-10-10 NOTE — DISCHARGE SUMMARY
Physician Discharge Summary     Patient ID:  Gisselle Dudley  [id number]  78 year old  1945    Admit date: 10/9/2023    Discharge date and time: 10/10/23     Admitting Physician: Bc Hoffmann MD     Discharge Physician: Bc Hoffmann MD     Admission Diagnoses: Osteoarthritis [M19.90]  Status post total hip replacement, right [Z96.641]    Discharge Diagnoses: Osteoarthritis [M19.90]  Status post total hip replacement, right [Z96.641]    Admission Condition: good    Discharged Condition: stable    Indication for Admission: Right total hip arthroplasty on 10/10/23    Hospital Course:   The patient was admitted for the above procedure on the above date. The patient tolerated the procedure well. The patient's hospital course was unremarkable and the patient progressed as expected. Pain was adequately controlled on oral pain medication. The patient was tolerating a general diet. The patient passed physical therapy without issue. The patient remained hemodynamically stable throughout the admission. Please refer to the final progress note for discharge physical exam. The patient met all discharge criteria and discharged in a stable condition. The patient has our contact information and understands reasons to contact our team or return to the Emergency Department.       Consults: none    Significant Diagnostic Studies: None    Treatments: Right total hip arthroplasty    Discharge Exam:  Please refer to progress note from 10/10/23 for discharge physical exam.     Disposition: home    Patient Instructions:   Current Discharge Medication List        START taking these medications    Details   acetaminophen (TYLENOL) 325 MG tablet Take 2 tablets (650 mg) by mouth every 4 hours as needed for other (mild pain)  Qty: 100 tablet, Refills: 0    Associated Diagnoses: History of total hip replacement, right      aspirin 81 MG EC tablet Take 1 tablet (81 mg) by mouth 2 times daily for 41 days  Qty: 82 tablet, Refills:  0    Associated Diagnoses: History of total hip replacement, right      celecoxib (CELEBREX) 200 MG capsule Take 1 capsule (200 mg) by mouth 2 times daily for 14 days Do not take within 6 hours of ibuprofen (MOTRIN, ADVIL) or ketorolac (TORADOL) if prescribed.  Qty: 28 capsule, Refills: 0    Associated Diagnoses: History of total hip replacement, right      oxyCODONE (ROXICODONE) 5 MG tablet Take 1-2 tablets (5-10 mg) by mouth every 4 hours as needed for moderate to severe pain  Qty: 26 tablet, Refills: 0    Associated Diagnoses: History of total hip replacement, right      polyethylene glycol (MIRALAX) 17 g packet Take 17 g by mouth daily  Qty: 7 packet, Refills: 0    Associated Diagnoses: History of total hip replacement, right      senna-docusate (SENOKOT-S/PERICOLACE) 8.6-50 MG tablet Take 1-2 tablets by mouth 2 times daily Take while on oral narcotics to prevent or treat constipation.  Qty: 30 tablet, Refills: 0    Comments: While taking narcotics  Associated Diagnoses: History of total hip replacement, right           CONTINUE these medications which have NOT CHANGED    Details   !! ALPRAZolam (XANAX) 0.25 MG tablet Take 1 tablet by mouth every evening as needed      !! alprazolam (XANAX) 0.25 MG tablet Take 1 tablet by mouth every morning      cyclobenzaprine (FLEXERIL) 10 MG tablet Take 1 tablet by mouth 3 times daily as needed for muscle spasms      fluorometholone (FML) 0.1 % ophthalmic suspension Place 1 drop Into the left eye daily.      levothyroxine (SYNTHROID/LEVOTHROID) 25 MCG tablet Take 1 tablet by mouth every morning (before breakfast)      triamcinolone (KENALOG-40) 40 MG/ML injection Inject 40 mg into the muscle every 6 months       !! - Potential duplicate medications found. Please discuss with provider.        Activity: activity as tolerated  Diet: regular diet  Wound Care: remove postoperative dressing on postoperative day 7    Follow-up with Dr. Hoffmann in 2 weeks    Signed:  Bc MADDOX  MD Shun  10/10/2023  7:49 AM

## 2023-10-10 NOTE — PROGRESS NOTES
10/10/23 1105   Appointment Info   Signing Clinician's Name / Credentials (OT) SHYANN Chavez   Student Supervision Direct supervision provided;Direct Patient Contact Provided;Therapy services provided with the co-signing licensed therapist guiding and directing the services, and providing the skilled judgement and assessment throughout the session   Living Environment   People in Home other relative(s)  (living w cousin)   Current Living Arrangements house   Home Accessibility stairs to enter home;stairs within home   Number of Stairs, Main Entrance 1   Stair Railings, Main Entrance none   Number of Stairs, Within Home, Primary greater than 10 stairs   Stair Railings, Within Home, Primary railing on right side (ascending)   Transportation Anticipated family or friend will provide   Living Environment Comments walk in shower w small ledge, shower is small   Self-Care   Usual Activity Tolerance good   Current Activity Tolerance fair   Regular Exercise Yes   Activity/Exercise Type walking   Exercise Amount/Frequency 3-5 times/wk   Equipment Currently Used at Home walker, rolling   Fall history within last six months no   Activity/Exercise/Self-Care Comment previously ind in all ADLs   Instrumental Activities of Daily Living (IADL)   Previous Responsibilities meal prep;housekeeping;laundry;shopping;yardwork;medication management;finances;driving   IADL Comments previously ind in all IADLs, retired   General Information   Onset of Illness/Injury or Date of Surgery 10/09/23   Referring Physician Bc Hoffmann MD   Patient/Family Therapy Goal Statement (OT) get better and go back to [place]   Additional Occupational Profile Info/Pertinent History of Current Problem Pt is a 78 year old female s/p RTHA   Existing Precautions/Restrictions fall;weight bearing  (WBAT RLE)   Left Upper Extremity (Weight-bearing Status) full weight-bearing (FWB)   Right Upper Extremity (Weight-bearing Status) full weight-bearing (FWB)    Left Lower Extremity (Weight-bearing Status) weight-bearing as tolerated (WBAT)   Right Lower Extremity (Weight-bearing Status) full weight-bearing (FWB)   Cognitive Status Examination   Orientation Status orientation to person, place and time   Visual Perception   Visual Impairment/Limitations corrective lenses full-time   Pain Assessment   Patient Currently in Pain No   Range of Motion Comprehensive   General Range of Motion no range of motion deficits identified   Strength Comprehensive (MMT)   General Manual Muscle Testing (MMT) Assessment no strength deficits identified   Transfers   Transfers sit-stand transfer;toilet transfer;shower transfer   Sit-Stand Transfer   Sit-Stand [place] (Transfers) modified independence   Assistive Device (Sit-Stand Transfers) walker, front-wheeled   Shower Transfer   Type (Shower Transfer) stand pivot/stand step   [place] Level (Shower Transfer) supervision   Shower Transfer Comments per clinical judgement   Toilet Transfer   Type (Toilet Transfer) sit-stand;stand-sit   [place] Level (Toilet Transfer) modified independence   Assistive Device (Toilet Transfer) walker, front-wheeled;raised toilet seat   Balance   Balance Assessment no deficits were identified   Activities of Daily Living   BADL Assessment/Intervention lower body dressing;grooming;upper body dressing   Upper Body Dressing Assessment/Training   [place] Level (Upper Body Dressing) independent   Lower Body Dressing Assessment/Training   Comment, (Lower Body Dressing) min VC for which leg to dress first   [place] Level (Lower Body Dressing) verbal cues;modified independence   Grooming Assessment/Training   [place] Level (Grooming) modified independence   Clinical Impression   Criteria for Skilled Therapeutic Interventions Met (OT) Yes, treatment indicated   OT Diagnosis dec ind s/p  RTHA   Influenced by the following impairments pain, surgical precautions   OT Problem List-Impairments  impacting ADL problems related to;activity tolerance impaired;range of motion (ROM);strength;post-surgical precautions;pain   Assessment of Occupational Performance 1-3 Performance Deficits   Planned Therapy Interventions (OT) ADL retraining;progressive activity/exercise;risk factor education   Clinical Decision Making Complexity (OT) low complexity   Anticipated Equipment Needs Upon Discharge (OT) walker, standard;raised toilet seat   Risk & Benefits of therapy have been explained evaluation/treatment results reviewed;care plan/treatment goals reviewed;risks/benefits reviewed;current/potential barriers reviewed;participants included;participants voiced agreement with care plan;patient   OT Total Evaluation Time   OT Eval, Low Complexity Minutes (21901) 10   OT Goals   Therapy Frequency (OT) One time eval and treatment   OT Predicted Duration/Target Date for Goal Attainment 10/10/23   OT Goals Lower Body Dressing;Transfers;Toilet Transfer/Toileting;Upper Body Dressing   OT: Upper Body Dressing Modified independent;Goal Met   OT: Lower Body Dressing Supervision/stand-by assist;Goal Met   OT: Transfer with assistive device;Modified independent;Goal Met   OT: Toilet Transfer/Toileting Modified independent;using adaptive equipment;Goal Met   Interventions   Interventions Quick Adds Self-Care/Home Management   Self-Care/Home Management   Self-Care/Home Mgmt/ADL, Compensatory, Meal Prep Minutes (79235) 25   Symptoms Noted During/After Treatment (Meal Preparation/Planning Training) increased pain   Treatment Detail/Skilled Intervention Pt was awake and alert in bedside recliner upon arrival. Pt agreeable to therapy. BP in sitting 96/57; pt reports hx of hypotension. Pt sit<>stand SBA w FWW, BP in standing 118/64, denies dizziness/LH. Pt ambulated around room SBA w FWW to increase activity tolerance ~50 ft. Pt ambulated into bathroom SBA w FWW and completed toilet transfer on raised toilet seat w Kimberley and FWW. Pt ambulated  back to recliner and sit<>stand Kimberley w FWW. Pt instructed on LB dressing technique after surgery; pt donned pants SBA/ min VC. Pt stood w FWW and donned sweatshirt [place]/ min VC. Pt ambulated to sink w Kimberley/FWW and ind brushed teeth and combed hair. Pt ambulated back to recliner w FWW and Kimberley. Demo'd safe car transfer technique and pt verbalizing understanding. BP in sitting 98/57. Pt left with all needs met, ice to hip, chair alarm on, nurse updated.   OT Discharge Planning   OT Plan d/c OT   OT Rationale for DC Rec Pt below baseline due to pain s/p YESSY. Pt has good activity tolerance and support at home, as well as understanding of safety awareness and use of FWW. Recommend  dc to home w assist from cousin as needed w heavy IADLs and driving.   OT Brief overview of current status SBA-> Kimberley all mobility and ADLs   Total Session Time   Timed Code Treatment Minutes 25   Total Session Time (sum of timed and untimed services) 35                                                                                   [facility]      OUTPATIENT OCCUPATIONAL THERAPY  EVALUATION  PLAN OF TREATMENT FOR OUTPATIENT REHABILITATION  (COMPLETE FOR INITIAL CLAIMS ONLY)  Patient's Last Name, First Name, M.I.  YOB: 1945  Gisselle Dudley                          Provider's Name  [facility] Medical Record No.  [id number]                               Onset Date:  10/09/23   Start of Care Date:        Type:     ___PT   _X_OT   ___SLP Medical Diagnosis:                           OT Diagnosis:  dec ind s/p  RTHA   Visits from SOC:  1   _________________________________________________________________________________  Plan of Treatment/Functional Goals    Planned Interventions: ADL retraining, progressive activity/exercise, risk factor education   Goals: See Occupational Therapy Goals on Care Plan in [facility] electronic health record.    Therapy Frequency: One time  eval and treatment  Predicted Duration of Therapy Intervention: 10/10/23  _________________________________________________________________________________    I CERTIFY THE NEED FOR THESE SERVICES FURNISHED UNDER        THIS PLAN OF TREATMENT AND WHILE UNDER MY CARE .             Physician Signature               Date    X_____________________________________________________                   ,      Referring Physician: Bc Hoffmann MD            Initial Assessment        See Occupational Therapy evaluation dated   in Epic electronic health record.

## 2023-10-10 NOTE — PROGRESS NOTES
PROGRESS NOTE    SUBJECTIVE  Gisselle Dudley is status post right total hip arthroplasty on 10/9/23. Afebrile with stable vitals. No acute events overnight. She is doing well this morning. Pain is well controlled. She has done well ambulating with physical therapy. Patient denies any numbness/tingling in the operative extremity, chest pain or shortness of breath.     PHYSICAL EXAM  General: No acute distress. Alert and oriented to person, time and place.  Pulmonary: Breathing comfortably on room air.   Cardiac: Bilateral PT pulses are equal and symmetric. Regular rate and rhythm.   Musculoskeletal: Postoperative dressing is clean and dry. No erythema surrounding dressing. Patient demonstrates active range of motion of the operative hip.   Neurological: Patient is able to fire quadriceps, tibialis anterior, extensor hallucis longus and gastrocnemius. Sensation intact to light touch in bilateral L3-S1 distributions.     LABS  Hb: 9.7    IMAGING  Post-operative radiographs were reviewed and demonstrate components in good position. No evidence of fracture or dislocation.     ASSESSMENT/PLAN  #1 Status post right total hip arthroplasty on 10/9/23    Gisselle Dudley is status post the above procedure. She is doing very well this morning. She will work with PT today and discharge home this afternoon.     Activity: Weightbearing as tolerated. Activity as tolerated. No precautions. Continue to work with PT while in hospital.   Antibiotics: Weight-based Ancef x2 doses postoperatively or until discharge.  Cultures: None  Diet: Advance as tolerated. Goal diet: General.   Dressing: Patient is able to shower. Patient will keep dressing in place until post-op day 7.  DVT Prophylaxis: Aspirin 81mg BID for 6 weeks  Pain medication: Multimodal pain regimen with ice, elevation, Tylenol 1000mg Q6H scheduled, Celebrex 200mg BID, Oxycodone 5-10mg Q4 PRN    Disposition: Stable and improving. Discharge home when patient has cleared  PT

## 2023-10-10 NOTE — PLAN OF CARE
Goal Outcome Evaluation:    Patient vital signs are at baseline: Yes  Patient able to ambulate as they were prior to admission or with assist devices provided by therapies during their stay:  Yes  Patient MUST void prior to discharge:  Yes  Patient able to tolerate oral intake:  Yes  Pain has adequate pain control using Oral analgesics:  Yes  Does patient have an identified :  Yes  Has goal D/C date and time been discussed with patient:  Yes     A&O x4  CMS intact  Dressing CDI  Oxycodone x1 for pain  Plan to discharge home tomorrow.

## 2023-10-10 NOTE — PROGRESS NOTES
Patient vital signs are at baseline: Yes  Patient able to ambulate as they were prior to admission or with assist devices provided by therapies during their stay:  Yes  Patient MUST void prior to discharge:  Yes  Patient able to tolerate oral intake:  Yes  Pain has adequate pain control using Oral analgesics:  Yes  Does patient have an identified :  Yes  Has goal D/C date and time been discussed with patient:  Yes    Alert and oriented x4. R hip dressing with aquacel, CDI. Voiding adequately. Active bowel sounds, admits to passing gas. Pt was discharged on 10/10/23 at 1200. Pt was medically stable upon discharge. AVS, including medications instructions were reviewed with pt.   Pt verbally admitted to understanding the instructions. Pt was accompanied to home by her cousin.

## 2023-10-10 NOTE — PLAN OF CARE
Occupational Therapy Discharge Summary    Reason for therapy discharge:    All goals and outcomes met, no further needs identified.    Progress towards therapy goal(s). See goals on Care Plan in [facility] electronic health record for goal details.  Goals met    Therapy recommendation(s):    No further therapy is recommended.  Pt below baseline due to pain s/p YESSY. Pt has good activity tolerance and support at home, as well as understanding of safety awareness and use of FWW. Recommend dc to home w assist from cousin as needed w heavy IADLs and driving.

## 2023-10-10 NOTE — PLAN OF CARE
Shift: 10/9-10/10 [id number]    Hx: Admitted 10/7 for R total hip     Orientation: A&Ox4     Behavior/Aggression: green    Vitals/Tele: VSS on RA     Pain: Scheduled Toradol and Tylenol given      Labs: Creat 0.80    IV Access/drains: PIV    Diet: Reg    Mobility: A1 GBW    GI/: Continent    Wound/Skin: R hip incision, dressing CDI    Consults: PT    Discharge Plan: 10/10 to home     Other:       See Flow sheets for assessment

## 2023-10-18 ENCOUNTER — DOCUMENTATION ONLY (OUTPATIENT)
Dept: OTHER | Facility: CLINIC | Age: 78
End: 2023-10-18
Payer: MEDICARE

## 2023-10-19 ENCOUNTER — APPOINTMENT (OUTPATIENT)
Dept: ULTRASOUND IMAGING | Facility: CLINIC | Age: 78
End: 2023-10-19
Attending: STUDENT IN AN ORGANIZED HEALTH CARE EDUCATION/TRAINING PROGRAM
Payer: MEDICARE

## 2023-10-19 ENCOUNTER — MEDICAL CORRESPONDENCE (OUTPATIENT)
Dept: HEALTH INFORMATION MANAGEMENT | Facility: CLINIC | Age: 78
End: 2023-10-19
Payer: MEDICARE

## 2023-10-19 ENCOUNTER — HOSPITAL ENCOUNTER (EMERGENCY)
Facility: CLINIC | Age: 78
Discharge: HOME | End: 2023-10-19
Attending: EMERGENCY MEDICINE | Admitting: EMERGENCY MEDICINE
Payer: MEDICARE

## 2023-10-19 VITALS
DIASTOLIC BLOOD PRESSURE: 71 MMHG | TEMPERATURE: 97.7 F | RESPIRATION RATE: 22 BRPM | OXYGEN SATURATION: 98 % | HEIGHT: 66 IN | WEIGHT: 153 LBS | HEART RATE: 72 BPM | SYSTOLIC BLOOD PRESSURE: 129 MMHG | BODY MASS INDEX: 24.59 KG/M2

## 2023-10-19 DIAGNOSIS — M79.89 RIGHT LEG SWELLING: ICD-10-CM

## 2023-10-19 PROCEDURE — 250N000013 HC RX MED GY IP 250 OP 250 PS 637

## 2023-10-19 PROCEDURE — 93971 EXTREMITY STUDY: CPT | Mod: RT

## 2023-10-19 PROCEDURE — 99284 EMERGENCY DEPT VISIT MOD MDM: CPT | Mod: 25

## 2023-10-19 RX ORDER — OXYCODONE HYDROCHLORIDE 5 MG/1
5 TABLET ORAL ONCE
Status: COMPLETED | OUTPATIENT
Start: 2023-10-19 | End: 2023-10-19

## 2023-10-19 RX ORDER — HYDROXYZINE HYDROCHLORIDE 25 MG/1
25 TABLET, FILM COATED ORAL ONCE
Status: COMPLETED | OUTPATIENT
Start: 2023-10-19 | End: 2023-10-19

## 2023-10-19 RX ORDER — HYDROXYZINE HYDROCHLORIDE 25 MG/1
25 TABLET, FILM COATED ORAL 3 TIMES DAILY PRN
Qty: 60 TABLET | Refills: 0 | Status: SHIPPED | OUTPATIENT
Start: 2023-10-19

## 2023-10-19 RX ORDER — ACETAMINOPHEN 500 MG
1000 TABLET ORAL ONCE
Status: COMPLETED | OUTPATIENT
Start: 2023-10-19 | End: 2023-10-19

## 2023-10-19 RX ADMIN — HYDROXYZINE HYDROCHLORIDE 25 MG: 25 TABLET, FILM COATED ORAL at 15:03

## 2023-10-19 RX ADMIN — OXYCODONE HYDROCHLORIDE 5 MG: 5 TABLET ORAL at 15:04

## 2023-10-19 RX ADMIN — ACETAMINOPHEN 1000 MG: 500 TABLET, FILM COATED ORAL at 15:03

## 2023-10-19 ASSESSMENT — ACTIVITIES OF DAILY LIVING (ADL)
ADLS_ACUITY_SCORE: 37
ADLS_ACUITY_SCORE: 37

## 2023-10-19 NOTE — ED TRIAGE NOTES
Patient had right hip replaced last week now having increased swelling and pain all the way down to ankle.

## 2023-10-19 NOTE — ED PROVIDER NOTES
ED ATTENDING PHYSICIAN NOTE:   I evaluated this patient in conjunction with Jennifer Barbosa PA-C  I have participated in the care of the patient and personally performed key elements of the history, exam, and medical decision making.      HPI:   Gisselle Dudley is a 78 year old female, 2 weeks s/p right hip arthroplasty, who presents with worsening right leg swelling and pain. The swelling is better in the morning, but tends to worsen as the day goes on. She was at a follow-up appointment with orthopedics this morning, but was advised to go to the ED due to concern for a blood clot. She is currently taking oxycodone for pain management. She denies fevers or recent falls/injuries. She is currently visiting [place] from [place].     Independent Historian:   The cousin    Review of External Notes: None      EXAM:   General: Patient in mild distress.  Alert and cooperative with exam. Normal mentation  HEENT: NC/AT. Conjunctiva without injection or scleral icterus. External ears normal.  Respiratory: Breathing comfortably on room air  CV: Normal rate, all extremities well perfused  GI:  Non-distended abdomen  Skin: Warm, dry, no rashes/open wounds on exposed skin  Musculoskeletal: RLE CMS intact. Anterior surgical hip wound is C/D/I with signs of infection. Moderate no pitting edema to lower extremity with venous stasis changes. Compartment compressible. No significant tenderness on palpation.   Neuro: Alert, answers questions appropriately. No gross motor deficits      Independent Interpretation (X-rays, CTs, rhythm strip):  None     Consultations/Discussion of Management or Tests:  None      Social Determinants of Health affecting care:   None     MEDICAL DECISION MAKING/ASSESSMENT AND PLAN:   Gisselle Dudley is a 78 year old female who presents for evaluation of leg swelling 2-week status post right hip replacement. A broad differential was considered including Baker's cyst rupture, Baker's cyst, hematoma,  rupture, compartment syndrome, muscle rupture, DVT, superficial thrombophlebitis, compression of the venous structures higher up in the abdomen and or leg cellulitis/ abscess, etc. Ultrasound is negative. There are no signs of compartment syndrome or other worrisome etiologies at this point so outpatient management is indicated.  Presentation consistent with postoperative extremity edema and venous stasis.  Recommended compression sock/Ace wrap and elevation.  Recommended close follow-up with orthopedic/PCP if not improving.  Return precautions discussed.       DIAGNOSIS:     ICD-10-CM    1. Right leg swelling  M79.89            DISPOSITION:   The patient was discharged to home.      Scribe Disclosure:  I, Shaheed Celaya, am serving as a scribe at 3:31 PM on 10/19/2023 to document services personally performed by Cricket Caldwell DO based on my observations and the provider's statements to me.   10/19/2023  [facility] EMERGENCY DEPT     Cricket Caldwell DO  10/19/23 [id number]

## 2023-10-19 NOTE — ED PROVIDER NOTES
History     Chief Complaint:  Leg Swelling       HPI   Gisselle Dudley is a 78 year old female with history significant for recent hip replacement on 10/10/23 presenting today for evaluation of right lower extremity swelling that has been progressing since surgery.  She also has pain to the right leg.  Pain in the right hip is well-controlled with at home medications.  Has been taking oxycodone and Tylenol at home for pain.  Stopped celebrex 2 days ago without change in her symptoms.  Pain improves with walking and worsens with rest.  No fevers, chest pain, or difficulty breathing.    Independent Historian:   None - Patient Only    Review of External Notes:   Discharge summary from 10/10/23.     Medications:    hydrOXYzine (ATARAX) 25 MG tablet  acetaminophen (TYLENOL) 325 MG tablet  alprazolam (XANAX) 0.25 MG tablet  aspirin 81 MG EC tablet  celecoxib (CELEBREX) 200 MG capsule  cyclobenzaprine (FLEXERIL) 10 MG tablet  fluorometholone (FML) 0.1 % ophthalmic suspension  levothyroxine (SYNTHROID/LEVOTHROID) 25 MCG tablet  oxyCODONE (ROXICODONE) 5 MG tablet  polyethylene glycol (MIRALAX) 17 g packet  senna-docusate (SENOKOT-S/PERICOLACE) 8.6-50 MG tablet  triamcinolone (KENALOG-40) 40 MG/ML injection      Past Medical History:    Past Medical History:   Diagnosis Date    allergic rhinitis     Depression with anxiety     Hypothyroidism     Osteoporosis     Uveitis      Past Surgical History:    Past Surgical History:   Procedure Laterality Date    BREAST BIOPSY, RT/LT Left 1968    BREAST CYST ASPIRATION Bilateral     CHOLECYSTECTOMY      EXCISE MASS BACK  11/28/2011    Procedure:EXCISE MASS BACK; biopsy subcutaneous mass on right side.; Surgeon:TEJAL CANALES; Location:[facility] OR    EYE SURGERY      FUSION CERVICAL POSTERIOR TWO LEVELS  11/28/2011    Procedure:FUSION CERVICAL POSTERIOR TWO LEVELS; LEFT C5-6 AND C6-7 FORAMINOTOMY, DISCECTOMY, BIOPSY SUBCUTANEOUS MASS ON RIGHT SIDE; Surgeon:TEJAL CANALES;  "Location: OR    GENITOURINARY SURGERY      stent and stones    KIDNEY STONE SURGERY      REMOVAL, HARDWARE, SPINE, LUMBAR, 1 LEVEL, POSTERIOR APPROACH, USING OPTICAL TRACKING SYSTEM Right 10/9/2023    Procedure: ARTHROPLASTY, HIP, TOTAL, DIRECT ANTERIOR APPROACH, USING ORTHOGRID;  Surgeon: Bc Hoffmann MD;  Location: [facility] OR    right thumb trigger release Right     TONSILLECTOMY        Physical Exam   Patient Vitals for the past 24 hrs:   BP Temp Temp src Pulse Resp SpO2 Height Weight   10/19/23 1338 -- -- -- -- -- 98 % -- --   10/19/23 1337 129/71 -- -- 72 -- -- -- --   10/19/23 1116 123/63 97.7  F (36.5  C) Oral 79 22 97 % 1.676 m (5' 6\") 69.4 kg (153 lb)        Physical Exam  /71   Pulse 72   Temp 97.7  F (36.5  C) (Oral)   Resp 22   Ht 1.676 m (5' 6\")   Wt 69.4 kg (153 lb)   SpO2 98%   BMI 24.69 kg/m     General: Lying flat on gurney.  Seen and examined in ED30.  Head: Atraumatic. Normocephalic.  EENT: Moist mucus membranes.   CV: Regular rate and rhythm. No appreciable murmurs, rubs, or gallops. 2+ DP and PT pulses bilaterally.  Respiratory: Breathing comfortably on room air. Lungs clear to auscultation bilaterally without wheezes, rhonchi, or rales.  Msk: Right lower extremity appears swollen.  Compartments are compressible.    Skin: Skin is warm. Venous stasis changes to bilateral lower extremities.  Surgical site to the anterior right hip is well-appearing without significant tenderness or any erythema, edema, or drainage.  Neuro: Awake, alert, and conversant. No focal neurologic deficits.  Sensation intact to light touch throughout lower extremity.  Psych: Appropriate mood and affect.    Emergency Department Course   Imaging:  US Lower Extremity Venous Duplex Right   Final Result   IMPRESSION: Negative for DVT in the right lower extremity.      RODOLFO KELSEY MD            SYSTEM ID:  [id number]         Report per radiology    Emergency Department Course & " Assessments:  Interventions:  Medications   hydrOXYzine (ATARAX) tablet 25 mg (25 mg Oral $Given 10/19/23 1503)   oxyCODONE (ROXICODONE) tablet 5 mg (5 mg Oral $Given 10/19/23 1504)   acetaminophen (TYLENOL) tablet 1,000 mg (1,000 mg Oral $Given 10/19/23 1503)        Assessments and Consultations:  Patient was seen in conjunction with attending physician Dr. Caldwell.    1440   I performed my initial evaluation of the patient  ED Course as of 10/19/23 1620   Thu Oct 19, 2023   1530 Discussed findings and plan with patient.     Independent Interpretation (X-rays, CTs, rhythm strip):  None    Disposition:  The patient was discharged to home.     Impression & Plan    Medical Decision Makin year old female here with right lower extremity swelling in the setting of right hip replacement 9 days ago.  Vital signs are within normal limits.  Differential diagnosis includes DVT, arterial embolism, post-operative swelling, cellulitis, compartment syndrome. Ultrasound negative for DVT.  Pulses palpable so doubt arterial embolism.  Compartments are soft, doubt compartment syndrome.  Surgical site is well-appearing without any surrounding erythema, edema, or drainage to suggest infection.  No skin changes consistent with cellulitis.  My suspicion is for post-operative swelling.  Plan will be for continued conservative management at home with pain medication, elevation, compression.  She is scheduled to follow up for the surgery on 10/23 and swelling can be discussed further at that time.  Findings and plan discussed with the patient.  She was in agreement with the plan and will be discharged to home in stable condition.     Diagnosis:    ICD-10-CM    1. Right leg swelling  M79.89          Discharge Medications:  Discharge Medication List as of 10/19/2023  3:48 PM        START taking these medications    Details   hydrOXYzine (ATARAX) 25 MG tablet Take 1 tablet (25 mg) by mouth 3 times daily as needed for itching, Disp-60  tablet, R-0, E-Prescribe            Jennifer Barbosa PA-C  October 19, 2023   [facility] EMERGENCY DEPT          Jennifer Barbosa PA-C  10/19/23 [id number]

## 2023-10-19 NOTE — DISCHARGE INSTRUCTIONS
You were seen in the emergency department today for evaluation of right leg swelling in the setting of a right hip replacement on 10/10/23.  Ultrasound today did not show any evidence of a blood clot.  This is compatible with postoperative swelling.  I recommend the following:    Continue with at home pain medications as prescribed  You can take up to 1000 mg 3 times daily, every 8 hours as needed  There is some evidence that taking hydroxyzine with the opioid pain medications can enhance their effects, consider taking these together  Keep the leg elevated above the heart  Wear compression stockings, which you can get at the store

## 2023-10-29 ENCOUNTER — HEALTH MAINTENANCE LETTER (OUTPATIENT)
Age: 78
End: 2023-10-29

## 2024-12-21 ENCOUNTER — HEALTH MAINTENANCE LETTER (OUTPATIENT)
Age: 79
End: 2024-12-21

## (undated) DEVICE — SOL NACL 0.9% INJ 250ML BAG 2B1322Q

## (undated) DEVICE — SU DERMABOND ADVANCED .7ML DNX12

## (undated) DEVICE — SOL NACL 0.9% INJ 1000ML BAG 2B1324X

## (undated) DEVICE — PINNACLE GRIPTION ACETABULAR SHELL SECTOR 54MM OD
Type: IMPLANTABLE DEVICE | Site: HIP | Status: NON-FUNCTIONAL
Brand: PINNACLE GRIPTION

## (undated) DEVICE — KIT PATIENT CARE HANA TABLE PROFX SUPINE 6855

## (undated) DEVICE — LINEN TOWEL PACK X5 5464

## (undated) DEVICE — PACK TOTAL HIP W/U DRAPE SOP15HUFSC

## (undated) DEVICE — SOL WATER IRRIG 1000ML BOTTLE 2F7114

## (undated) DEVICE — BONE CLEANING TIP INTERPULSE  0210-010-000

## (undated) DEVICE — DRAPE STERI U 1015

## (undated) DEVICE — SU ETHIBOND 2 V-37 4X30" MX69G

## (undated) DEVICE — ADH REMOVER PAD UNI-SOLVE 402300

## (undated) DEVICE — ESU GROUND PAD UNIVERSAL W/O CORD

## (undated) DEVICE — SU VICRYL 0 CTX CR 8X18" J764D

## (undated) DEVICE — DECANTER BAG 2002S

## (undated) DEVICE — DRSG AQUACEL AG HYDROFIBER  3.5X10" 422605

## (undated) DEVICE — CLOSURE SYS SKIN PREMIERPRO EXOFIN FUSION 4X22CM STRL 3472

## (undated) DEVICE — SUCTION TIP YANKAUER STR K87

## (undated) DEVICE — DRAPE CONVERTORS U-DRAPE 60X72" 8476

## (undated) DEVICE — DRAPE IOBAN ISOLATION VERTICAL 320X21CM 6617

## (undated) DEVICE — SU STRATAFIX PDS PLUS 2-0 SPIRAL CT-1 30CM SXPP1B410

## (undated) DEVICE — ESU BIPOLAR SEALER AQUAMANTYS 6MM 23-112-1

## (undated) DEVICE — DRAPE SHEET REV FOLD 3/4 9349

## (undated) DEVICE — ROD SYN REAMER BALL TIP 2.5X950MM  351.706S

## (undated) DEVICE — MANIFOLD NEPTUNE 4 PORT 700-20

## (undated) DEVICE — NDL 19GA 1.5"

## (undated) DEVICE — SUCTION IRR SYSTEM W/O TIP INTERPULSE HANDPIECE 0210-100-000

## (undated) DEVICE — HOOD SURG T7PLUS PEEL AWAY FACE SHIELD STRL LF 0416-801-100

## (undated) DEVICE — BLADE SAW SAGITTAL STRK 18X90X1.27MM HD SYS 6 6118-127-090

## (undated) DEVICE — SU STRATAFIX PDS PLUS 0 CT 45CM SXPP1A406

## (undated) DEVICE — PINNACLE POROCOAT ACETABULAR SHELL SECTOR II 54MM OD
Type: IMPLANTABLE DEVICE | Site: HIP | Status: NON-FUNCTIONAL
Brand: PINNACLE POROCOAT

## (undated) DEVICE — DRAPE C-ARM 60X42" 1013

## (undated) DEVICE — SOLUTION WOUND CLEANSING 3/4OZ 10% PVP EA-L3011FB-50

## (undated) DEVICE — SU VICRYL 2-0 CP-1 27" UND J266H

## (undated) DEVICE — PINNACLE CANCELLOUS BONE SCREW 6.5MM X 25MM
Type: IMPLANTABLE DEVICE | Site: HIP | Status: NON-FUNCTIONAL
Brand: PINNACLE

## (undated) DEVICE — SU MONOCRYL 3-0 PS-2 27" Y427H

## (undated) DEVICE — SOL NACL 0.9% IRRIG 1000ML BOTTLE 2F7124

## (undated) DEVICE — DRAPE IOBAN INCISE 23X17" 6650EZ

## (undated) DEVICE — SYR 50ML LL W/O NDL 309653

## (undated) RX ORDER — PROPOFOL 10 MG/ML
INJECTION, EMULSION INTRAVENOUS
Status: DISPENSED
Start: 2023-10-09

## (undated) RX ORDER — VANCOMYCIN HYDROCHLORIDE 1 G/20ML
INJECTION, POWDER, LYOPHILIZED, FOR SOLUTION INTRAVENOUS
Status: DISPENSED
Start: 2023-10-09

## (undated) RX ORDER — ONDANSETRON 2 MG/ML
INJECTION INTRAMUSCULAR; INTRAVENOUS
Status: DISPENSED
Start: 2023-10-09

## (undated) RX ORDER — ACETAMINOPHEN 325 MG/1
TABLET ORAL
Status: DISPENSED
Start: 2023-10-09

## (undated) RX ORDER — FENTANYL CITRATE 50 UG/ML
INJECTION, SOLUTION INTRAMUSCULAR; INTRAVENOUS
Status: DISPENSED
Start: 2023-10-09

## (undated) RX ORDER — CEFAZOLIN SODIUM/WATER 2 G/20 ML
SYRINGE (ML) INTRAVENOUS
Status: DISPENSED
Start: 2023-10-09

## (undated) RX ORDER — FENTANYL CITRATE 0.05 MG/ML
INJECTION, SOLUTION INTRAMUSCULAR; INTRAVENOUS
Status: DISPENSED
Start: 2023-10-09

## (undated) RX ORDER — DEXAMETHASONE SODIUM PHOSPHATE 4 MG/ML
INJECTION, SOLUTION INTRA-ARTICULAR; INTRALESIONAL; INTRAMUSCULAR; INTRAVENOUS; SOFT TISSUE
Status: DISPENSED
Start: 2023-10-09

## (undated) RX ORDER — HYDROMORPHONE HYDROCHLORIDE 1 MG/ML
INJECTION, SOLUTION INTRAMUSCULAR; INTRAVENOUS; SUBCUTANEOUS
Status: DISPENSED
Start: 2023-10-09

## (undated) RX ORDER — TRANEXAMIC ACID 650 MG/1
TABLET ORAL
Status: DISPENSED
Start: 2023-10-09